# Patient Record
Sex: MALE | Race: BLACK OR AFRICAN AMERICAN | NOT HISPANIC OR LATINO | URBAN - METROPOLITAN AREA
[De-identification: names, ages, dates, MRNs, and addresses within clinical notes are randomized per-mention and may not be internally consistent; named-entity substitution may affect disease eponyms.]

---

## 2020-10-24 ENCOUNTER — EMERGENCY (EMERGENCY)
Facility: HOSPITAL | Age: 40
LOS: 1 days | Discharge: HOME | End: 2020-10-24
Attending: EMERGENCY MEDICINE
Payer: COMMERCIAL

## 2020-10-24 VITALS
HEIGHT: 72 IN | HEART RATE: 92 BPM | WEIGHT: 210.1 LBS | RESPIRATION RATE: 19 BRPM | DIASTOLIC BLOOD PRESSURE: 83 MMHG | SYSTOLIC BLOOD PRESSURE: 155 MMHG | OXYGEN SATURATION: 99 % | TEMPERATURE: 98 F

## 2020-10-24 VITALS
SYSTOLIC BLOOD PRESSURE: 129 MMHG | OXYGEN SATURATION: 98 % | RESPIRATION RATE: 16 BRPM | HEART RATE: 75 BPM | DIASTOLIC BLOOD PRESSURE: 79 MMHG

## 2020-10-24 PROCEDURE — 72125 CT NECK SPINE W/O DYE: CPT

## 2020-10-24 PROCEDURE — 70450 CT HEAD/BRAIN W/O DYE: CPT | Mod: 26

## 2020-10-24 PROCEDURE — 70450 CT HEAD/BRAIN W/O DYE: CPT

## 2020-10-24 PROCEDURE — 99284 EMERGENCY DEPT VISIT MOD MDM: CPT | Mod: 25

## 2020-10-24 PROCEDURE — 99285 EMERGENCY DEPT VISIT HI MDM: CPT

## 2020-10-24 PROCEDURE — 72125 CT NECK SPINE W/O DYE: CPT | Mod: 26

## 2020-10-24 NOTE — ED ADULT TRIAGE NOTE - CHIEF COMPLAINT QUOTE
back of head numbness/dizzy/headache, jaw pain, recent fall from ladder 10 ft high on oct 21, 2020, +LOC, seen at [facility], +staples to top of head

## 2020-10-24 NOTE — ED PROVIDER NOTE - PROGRESS NOTE DETAILS
Attending MD Teixeira: Patient re-evaluated and feeling improved.  No acute issues at  this time.  Radiology tests reviewed with patient.  Patient stable for discharge. Follow up instructions given, instructed to follow up at [facility] for suture removal, will give number for Concussion clinic, importance of follow up emphasized, return to ED parameters reviewed and patient verbalized understanding.  All questions answered, all concerns addressed.

## 2020-10-24 NOTE — ED PROVIDER NOTE - NSFOLLOWUPINSTRUCTIONS_ED_ALL_ED_FT
1.  Stay well hydrated  2.  Take Tylenol 650mgs every 4-6 hrs and/or Ibuprofen 600mgs every 6 hrs as needed for pain   3.  Rest, limit screen time.  Avoid heavy lifting  4.  Follow up with your PMD in 2-3 days.  Bring a copy of your results with you to your appointment       Follow up with neurology and concussion clinic upon discharge  5.  Return to the ER for worsening headache, persistent nausea/vomiting, weakness, blurry vision or any other concerning symptoms

## 2020-10-24 NOTE — ED ADULT NURSE NOTE - CHPI ED NUR SYMPTOMS NEG
no blurred vision/no weakness/no seizure/no vomiting/no loss of consciousness/no syncope/no change in level of consciousness/no confusion

## 2020-10-24 NOTE — ED ADULT NURSE NOTE - NSIMPLEMENTINTERV_GEN_ALL_ED
Implemented All Fall Risk Interventions:  [place] to call system. Call bell, personal items and telephone within reach. Instruct patient to call for assistance. Room bathroom lighting operational. Non-slip footwear when patient is off stretcher. Physically safe environment: no spills, clutter or unnecessary equipment. Stretcher in lowest position, wheels locked, appropriate side rails in place. Provide visual cue, wrist band, yellow gown, etc. Monitor gait and stability. Monitor for mental status changes and reorient to person, place, and time. Review medications for side effects contributing to fall risk. Reinforce activity limits and safety measures with patient and family.

## 2020-10-24 NOTE — ED ADULT NURSE NOTE - OBJECTIVE STATEMENT
Male 40 years old with no past medical history came in for head injury. Pt fell 10 feet high ladder at work Oct 21 with positive LOC. Pt reports numbness at back of his head, dizziness and headache. Has staples at top of his head. Was seen at [facility], diagnosed with concussion. Denies vison change, N/V, chest pain or sob, or weakness of extremities. PERRL. Will continue to reassess.

## 2020-10-24 NOTE — ED PROVIDER NOTE - CARE PROVIDER_API CALL
Sergei Torres  NEUROLOGY  [address], [address]  [place] [address]  Phone: [phone]  Fax: [phone]  Follow Up Time:

## 2020-10-24 NOTE — ED ADULT NURSE NOTE - CHIEF COMPLAINT QUOTE
back of head numbness/dizzy/headache, jaw pain, recent fall from ladder 10 ft high on oct 21, 2020, +LOC, seen at [facility], +staples to top of head

## 2020-10-24 NOTE — ED PROVIDER NOTE - PATIENT PORTAL LINK FT
You can access the FollowMyHealth Patient Portal offered by [facility] by registering at the following website: [email or web address]. By joining [facility]’s FollowMyHealth portal, you will also be able to view your health information using other applications (apps) compatible with our system.

## 2020-10-24 NOTE — ED PROVIDER NOTE - ATTENDING CONTRIBUTION TO CARE
Attending MD Teixeira:   I personally have seen and examined this patient.  Physician assistant note reviewed and agree on plan of care and except where noted.  See below for details.     Seen in [place] 9    40M with PMH/PSH including bilateral inguinal hernia repairs presents to the ED with headache and     Reports has been taking Ibuprofen as prescribed at [facility].        TO BE COMPLETED Attending MD Teixeira:   I personally have seen and examined this patient.  Physician assistant note reviewed and agree on plan of care and except where noted.  See below for details.     Seen in [facility] 9    40M with PMH/PSH including bilateral inguinal hernia repairs presents to the ED with headache s/p fall from 10 foot ladder on 10/21/20.  Reports fell from a 10 ft ladder while at work on Wednesday, +LOC, hitting head.  Reports was sent to [facility] where he had a CT head and had lacerations repaired.  Reports no findings on CT.  Reports later that same day returned to the hospital because he was dizzy and was told he had a concussion.  Reports presents today because has persistent nausea, dizziness, headache.  Reports feels the pain from head travel into his jaw, denies difficulty opening and closing mouth, reports has been eating without difficulty. Denies change in vision, double vision, sudden loss of vision. Denies loss of urinary or bowel continence. Denies chest pain, shortness of breath, palpitations. Denies abdominal pain, nausea, vomiting, diarrhea, blood in stools. Denies dysuria, hematuria, change in urinary habits including frequency, urgency. Denies fevers, chills, dizziness, weakness. A ten (10) point review of systems was negative other than as stated in the HPI or elsewhere in the chart.     Exam:   General: NAD, AAOx3  HENT: head NCAT except for staples in place on crown of head, no fluctuance, no purulence, airway patent with moist mucous membranes  Eyes: PERRL  Lungs: lungs CTAB with good inspiratory effort, no wheezing, no rhonchi, no rales  Cardiac: +S1S2, no m/r/g  GI: abdomen soft with +BS, NT, ND  : no CVAT  MSK: FROM at neck, no tenderness to midline palpation, no stepoffs along length of spine, no calf tenderness, swelling, erythema or warmth  Neuro: CN 2-12 grossly intact, moving all extremities with 5/5 strength bilateral upper and lower extremities, sensory grossly intact, no gross neuro deficits  Psych: normal mood and affect     A/P: 40M with nausea, dizziness, headache, history and clinical presentation consistent with concussion but given mechanism will check CT head and CT C spine, discussed with patient, concussion more likely

## 2020-11-02 PROBLEM — Z78.9 OTHER SPECIFIED HEALTH STATUS: Chronic | Status: ACTIVE | Noted: 2020-10-24

## 2020-11-02 PROBLEM — Z00.00 ENCOUNTER FOR PREVENTIVE HEALTH EXAMINATION: Status: ACTIVE | Noted: 2020-11-02

## 2020-11-04 NOTE — HISTORY OF PRESENT ILLNESS
[FreeTextEntry1] : 40 Male with no significant medical history presented  to the ED with headache s/p fall from 10 foot ladder on 10/21/20. Reports fell from a 10 ft ladder while at work on Wednesday, +LOC, hitting head. Reports was sent to [facility] where he had a CT head and had lacerations repaired. Reports\par no findings on CT. Reports later that same day returned to the hospital because he was dizzy and was told he had a concussion. Reports presents today because has persistent nausea, dizziness, headache. Reports feels the pain from head travel into his jaw, denies difficulty opening and closing mouth,\par reports has been eating without difficulty. Denies change in vision, double vision, sudden loss of vision. Denies loss of urinary or bowel continence.

## 2020-11-06 ENCOUNTER — APPOINTMENT (OUTPATIENT)
Dept: NEUROSURGERY | Facility: CLINIC | Age: 40
End: 2020-11-06

## 2023-03-08 NOTE — ED ADULT NURSE NOTE - EXTENSIONS OF SELF_ADULT
None Pounds Preamble Statement (Weight Entered In Details Tab): Reported Weight in pounds: 150 Weight Units: pounds Cheilitis Aggressive Treatment: I recommended application of Vaseline or Aquaphor numerous times a day (as often as every hour) and before going to bed. I also prescribed a topical steroid for twice daily use. Show Text Field For Brand Names Of Contraception?: Yes Hypercholesterolemia Monitoring: I explained this is common when taking isotretinoin. We will monitor closely. Kilograms Preamble Statement (Weight Entered In Details Tab): Reported Weight in kilograms: Months Of Therapy Completed: 2 Xerosis Normal Treatment: I recommended application of Cetaphil or CeraVe numerous times a day going to bed to all dry areas. Hypertriglyceridemia Monitoring: I explained this is common when taking isotretinoin. If this worsens they will contact us. Any Depression?: No Female Completion Statement: After discussing her treatment course we decided to discontinue isotretinoin therapy at this time. I explained that she would need to continue her birth control methods for at least one month after the last dosage. She should also get a pregnancy test one month after the last dose. She shouldn't donate blood for one month after the last dose. She should call with any new symptoms of depression. Xerosis Aggressive Treatment: I recommended application of Cetaphil or CeraVe numerous times a day going to bed to all dry areas. I also prescribed a topical steroid for twice daily use. Dosing Month 1 (Required For Cumulative Dosing): 30mg Daily Is Cheilitis Present?: Yes - Normal Treatment Male Completion Statement: After discussing his treatment course we decided to discontinue isotretinoin therapy at this time. He shouldn't donate blood for one month after the last dose. He should call with any new symptoms of depression. Hypertriglyceridemia Treatment: I explained this is common when taking isotretinoin. If this worsens they will contact us. They may try OTC ibuprofen. Headache Monitoring: I recommended monitoring the headaches for now. There is no evidence of increased intracranial pressure. They were instructed to call if the headaches are worsening. Retinoid Dermatitis Normal Treatment: I recommended more frequent application of Cetaphil or CeraVe to the areas of dermatitis.\\nWIll aslo give Rx for triamcinolone Nosebleeds Normal Treatment: I explained this is common when taking isotretinoin. I recommended saline mist in each nostril multiple times a day. If this worsens they will contact us. Dosing Month 2 (Required For Cumulative Dosing): 30mg BID Retinoid Dermatitis Aggressive Treatment: I recommended more frequent application of Cetaphil or CeraVe to the areas of dermatitis. I also prescribed a topical steroid for twice daily use until the dermatitis resolves. Detail Level: Zone Cheilitis Normal Treatment: I recommended application of Vaseline or Aquaphor numerous times a day (as often as every hour) and before going to bed. What Is The Patient's Gender: Male Use Therapeutic Ranged Or Therapeutic Target: please select Range or Target Lower Range (In Mg/Kg): 120 Xerosis Normal Treatment: I recommended application of Cetaphil or CeraVe numerous times a day and before going to bed to all dry areas. Xerosis Aggressive Treatment: I recommended application of Cetaphil or CeraVe numerous times a day and before going to bed to all dry areas. I also prescribed a topical steroid for twice daily use. Upper Range (In Mg/Kg): 150 Retinoid Dermatitis Normal Treatment: I recommended more frequent application of Cetaphil or CeraVe to the areas of dermatitis. Counseling Text: I reviewed the side effect in detail. Patient should get monthly blood tests, not donate blood, not drive at night if vision affected, and not share medication. Female Pregnancy Counseling Text: Female patients should also be on two forms of birth control while taking this medication and for one month after their last dose. Target Cumulative Dosage (In Mg/Kg): 135 Ipledge Number (Optional): [id number]

## 2023-03-22 NOTE — ED ADULT NURSE NOTE - CHPI ED NUR TIMING2
worsening Pt is scheduled for a Laparoscopic Cholecystectomy with Dr. Monsalve on 4/10/23  CBC, CMP ordered and obtained at [facility]  M.E from 3/21/23 in chart
